# Patient Record
Sex: MALE | Race: WHITE
[De-identification: names, ages, dates, MRNs, and addresses within clinical notes are randomized per-mention and may not be internally consistent; named-entity substitution may affect disease eponyms.]

---

## 2021-05-05 ENCOUNTER — HOSPITAL ENCOUNTER (EMERGENCY)
Dept: HOSPITAL 53 - M ED | Age: 76
Discharge: HOME | End: 2021-05-05
Payer: OTHER GOVERNMENT

## 2021-05-05 VITALS — BODY MASS INDEX: 37.52 KG/M2 | HEIGHT: 64 IN | WEIGHT: 219.8 LBS

## 2021-05-05 VITALS — DIASTOLIC BLOOD PRESSURE: 67 MMHG | SYSTOLIC BLOOD PRESSURE: 135 MMHG

## 2021-05-05 DIAGNOSIS — E78.5: ICD-10-CM

## 2021-05-05 DIAGNOSIS — R07.9: Primary | ICD-10-CM

## 2021-05-05 DIAGNOSIS — I44.0: ICD-10-CM

## 2021-05-05 DIAGNOSIS — E11.9: ICD-10-CM

## 2021-05-05 DIAGNOSIS — I10: ICD-10-CM

## 2021-05-05 LAB
ALBUMIN SERPL BCG-MCNC: 4 GM/DL (ref 3.2–5.2)
ALT SERPL W P-5'-P-CCNC: 47 U/L (ref 12–78)
BASOPHILS # BLD AUTO: 0.1 10^3/UL (ref 0–0.2)
BASOPHILS NFR BLD AUTO: 0.9 % (ref 0–1)
BILIRUB CONJ SERPL-MCNC: 0.1 MG/DL (ref 0–0.2)
BILIRUB SERPL-MCNC: 0.4 MG/DL (ref 0.2–1)
BUN SERPL-MCNC: 26 MG/DL (ref 7–18)
CALCIUM SERPL-MCNC: 10.2 MG/DL (ref 8.8–10.2)
CHLORIDE SERPL-SCNC: 105 MEQ/L (ref 98–107)
CK MB CFR.DF SERPL CALC: 1.85
CK MB SERPL-MCNC: 2.7 NG/ML (ref ?–3.6)
CK SERPL-CCNC: 146 U/L (ref 39–308)
CO2 SERPL-SCNC: 28 MEQ/L (ref 21–32)
CREAT SERPL-MCNC: 1.44 MG/DL (ref 0.7–1.3)
EOSINOPHIL # BLD AUTO: 0.3 10^3/UL (ref 0–0.5)
EOSINOPHIL NFR BLD AUTO: 3.6 % (ref 0–3)
GFR SERPL CREATININE-BSD FRML MDRD: 51.2 ML/MIN/{1.73_M2} (ref 42–?)
GLUCOSE SERPL-MCNC: 98 MG/DL (ref 70–100)
HCT VFR BLD AUTO: 49.9 % (ref 42–52)
HGB BLD-MCNC: 16.7 G/DL (ref 13.5–17.5)
LIPASE SERPL-CCNC: 111 U/L (ref 73–393)
LYMPHOCYTES # BLD AUTO: 1.4 10^3/UL (ref 1.5–5)
LYMPHOCYTES NFR BLD AUTO: 16.9 % (ref 24–44)
MCH RBC QN AUTO: 29.5 PG (ref 27–33)
MCHC RBC AUTO-ENTMCNC: 33.5 G/DL (ref 32–36.5)
MCV RBC AUTO: 88.2 FL (ref 80–96)
MONOCYTES # BLD AUTO: 1 10^3/UL (ref 0–0.8)
MONOCYTES NFR BLD AUTO: 11.3 % (ref 2–8)
NEUTROPHILS # BLD AUTO: 5.7 10^3/UL (ref 1.5–8.5)
NEUTROPHILS NFR BLD AUTO: 66.8 % (ref 36–66)
PLATELET # BLD AUTO: 165 10^3/UL (ref 150–450)
POTASSIUM SERPL-SCNC: 4.2 MEQ/L (ref 3.5–5.1)
PROT SERPL-MCNC: 7.6 GM/DL (ref 6.4–8.2)
RBC # BLD AUTO: 5.66 10^6/UL (ref 4.3–6.1)
SODIUM SERPL-SCNC: 140 MEQ/L (ref 136–145)
T4 FREE SERPL-MCNC: 1.12 NG/DL (ref 0.76–1.46)
TROPONIN I SERPL-MCNC: < 0.02 NG/ML (ref ?–0.1)
TSH SERPL DL<=0.005 MIU/L-ACNC: 1.99 UIU/ML (ref 0.36–3.74)
WBC # BLD AUTO: 8.5 10^3/UL (ref 4–10)

## 2021-05-05 PROCEDURE — 85025 COMPLETE CBC W/AUTO DIFF WBC: CPT

## 2021-05-05 PROCEDURE — 93005 ELECTROCARDIOGRAM TRACING: CPT

## 2021-05-05 PROCEDURE — 82550 ASSAY OF CK (CPK): CPT

## 2021-05-05 PROCEDURE — 83690 ASSAY OF LIPASE: CPT

## 2021-05-05 PROCEDURE — 71045 X-RAY EXAM CHEST 1 VIEW: CPT

## 2021-05-05 PROCEDURE — 93041 RHYTHM ECG TRACING: CPT

## 2021-05-05 PROCEDURE — 80048 BASIC METABOLIC PNL TOTAL CA: CPT

## 2021-05-05 PROCEDURE — 71275 CT ANGIOGRAPHY CHEST: CPT

## 2021-05-05 PROCEDURE — 84484 ASSAY OF TROPONIN QUANT: CPT

## 2021-05-05 PROCEDURE — 84443 ASSAY THYROID STIM HORMONE: CPT

## 2021-05-05 PROCEDURE — 80076 HEPATIC FUNCTION PANEL: CPT

## 2021-05-05 PROCEDURE — 84439 ASSAY OF FREE THYROXINE: CPT

## 2021-05-05 PROCEDURE — 82553 CREATINE MB FRACTION: CPT

## 2021-05-05 PROCEDURE — 99285 EMERGENCY DEPT VISIT HI MDM: CPT

## 2021-05-05 PROCEDURE — 94760 N-INVAS EAR/PLS OXIMETRY 1: CPT

## 2021-05-05 PROCEDURE — 36415 COLL VENOUS BLD VENIPUNCTURE: CPT

## 2021-05-05 NOTE — ECGEPIP
[facility] - ED

                                       

                                       Test Date:    2021

Pat Name:     SARAH ANDRES            Department:   

Patient ID:   [id number]                 Room:         -

Gender:       Male                     Technician:   LR

:          1945               Requested By: Maja Lundborg-Gray 

Order Number: [id number]     Reading MD:   Sharron Cortez

                                 Measurements

Intervals                              Axis          

Rate:         76                       P:            53

KY:           216                      QRS:          15

QRSD:         102                      T:            68

QT:           382                                    

QTc:          429                                    

                           Interpretive Statements

Sinus rhythm with 1st degree AV block

No prior

Electronically Signed on 2021 20:28:48 EDT by Sharron Cortez

## 2021-05-05 NOTE — REP
INDICATION:

chest pain.



COMPARISON:

None.



TECHNIQUE:

Portable semi-erect a AP chest x-ray.



FINDINGS:

EKG electrodes are seen.  The lungs are well inflated and clear.  Right hemidiaphragm

is somewhat elevated.  Pleural angles are sharp.  Heart is not enlarged.  Pulmonary

vasculature is not increased.



IMPRESSION:

No active disease.





<Electronically signed by Edgar Akhtar > 05/05/21 0612

## 2021-05-05 NOTE — REP
INDICATION:

left sided chest pain prolonged ro pe.



COMPARISON:

Comparison is made with today's portable chest x-ray..



TECHNIQUE:

Contrast dose:  75 ML of Isovue 370 are administered intravenously.

CT technique:  Helical scanning is acquired and overlapping 1.5 mm and contiguous 3 mm

axial images are reformatted.  In addition, maximum intensity projection and

multiplanar re-formation images are generated in sagittal and coronal imaging

projections.



FINDINGS:

There is good opacification in the pulmonary arterial tree.  There is no evidence of

vessel cut off or filling defect to suggest pulmonary embolus.  Homogeneous opacity is

seen in the thoracic aorta.  There is no evidence of aneurysm or dissection.

Lung window settings demonstrate no evidence of pulmonary mass lesion or infiltrate.

No significant pulmonary nodule is appreciated.  There is no evidence of pleural or

pericardial effusion.

In the upper abdomen, calcified gallstones are noted in the gallbladder.  No adrenal

lesion is seen.



IMPRESSION:

No CT evidence of pulmonary embolus. No active cardiopulmonary disease seen.





<Electronically signed by Edgar Akhtar > 05/05/21 6300

## 2021-07-30 ENCOUNTER — HOSPITAL ENCOUNTER (OUTPATIENT)
Dept: HOSPITAL 53 - M LABSMTC | Age: 76
End: 2021-07-30
Attending: ANESTHESIOLOGY
Payer: OTHER GOVERNMENT

## 2021-07-30 DIAGNOSIS — Z20.822: ICD-10-CM

## 2021-07-30 DIAGNOSIS — Z01.812: Primary | ICD-10-CM

## 2021-08-04 ENCOUNTER — HOSPITAL ENCOUNTER (OUTPATIENT)
Dept: HOSPITAL 53 - M OPP | Age: 76
Discharge: HOME | End: 2021-08-04
Attending: SURGERY
Payer: OTHER GOVERNMENT

## 2021-08-04 VITALS — DIASTOLIC BLOOD PRESSURE: 75 MMHG | SYSTOLIC BLOOD PRESSURE: 132 MMHG

## 2021-08-04 VITALS — HEIGHT: 65 IN | BODY MASS INDEX: 35.29 KG/M2 | WEIGHT: 211.8 LBS

## 2021-08-04 DIAGNOSIS — Z79.899: ICD-10-CM

## 2021-08-04 DIAGNOSIS — E11.9: ICD-10-CM

## 2021-08-04 DIAGNOSIS — K63.5: Primary | ICD-10-CM

## 2021-08-04 DIAGNOSIS — Z79.4: ICD-10-CM

## 2021-08-04 DIAGNOSIS — Z85.46: ICD-10-CM

## 2021-08-04 DIAGNOSIS — K92.1: ICD-10-CM

## 2021-08-04 DIAGNOSIS — K64.1: ICD-10-CM

## 2021-08-04 DIAGNOSIS — K62.7: ICD-10-CM

## 2021-08-04 NOTE — ROOR
________________________________________________________________________________

Patient Name: Richard Silverman            Procedure Date: 8/4/2021 7:31 AM

MRN: [id number]                          Account Number: [id number]

YOB: 1945               Age: 75

Room: [facility]                            Gender: Male

Note Status: Finalized                 

________________________________________________________________________________

 

Procedure:            Colonoscopy

Indications:          Hematochezia

Providers:            DO Brittnee Rosas MD:         Katelyn De La Garza, [facility], Admin.

Requesting Provider:  

Medicines:            Propofol per Anesthesia

Complications:        No immediate complications.

________________________________________________________________________________

Procedure:            Pre-Anesthesia Assessment:

                      - Prior to the procedure, a History and Physical was 

                      performed, and patient medications and allergies were 

                      reviewed. The patient is competent. The risks and 

                      benefits of the procedure and the sedation options and 

                      risks were discussed with the patient. All questions 

                      were answered and informed consent was obtained. Patient 

                      identification and proposed procedure were verified by 

                      the physician, the nurse, the anesthetist and the 

                      technician in the endoscopy suite. Mental Status 

                      Examination: alert and oriented. Airway Examination: 

                      normal oropharyngeal airway and neck mobility. 

                      Respiratory Examination: clear to auscultation. CV 

                      Examination: normal. Prophylactic Antibiotics: The 

                      patient does not require prophylactic antibiotics. Prior 

                      Anticoagulants: The patient has taken no previous 

                      anticoagulant or antiplatelet agents. ASA Grade 

                      Assessment: III - A patient with severe systemic 

                      disease. After reviewing the risks and benefits, the 

                      patient was deemed in satisfactory condition to undergo 

                      the procedure. The anesthesia plan was to use monitored 

                      anesthesia care (MAC). Immediately prior to 

                      administration of medications, the patient was 

                      re-assessed for adequacy to receive sedatives. The heart 

                      rate, respiratory rate, oxygen saturations, blood 

                      pressure, adequacy of pulmonary ventilation, and 

                      response to care were monitored throughout the 

                      procedure. The physical status of the patient was 

                      re-assessed after the procedure.

                      The Colonoscope was introduced through the anus and 

                      advanced to the cecum, identified by appendiceal orifice 

                      and ileocecal valve. The colonoscopy was performed 

                      without difficulty. The patient tolerated the procedure 

                      well.

                                                                                

Findings:

     A less than 5 mm polyp was found in the sigmoid colon. The polyp was 

     hyperplastic. The polyp was removed with a jumbo cold forceps. Resection 

     and retrieval were complete. Estimated blood loss: none.

     Localized mild inflammation characterized by adherent blood, altered 

     vascularity, congestion (edema), friability and mucus was found in the 

     rectum. Biopsies were taken with a cold forceps for histology. Estimated 

     blood loss was minimal.

     Non-bleeding internal hemorrhoids were found during retroflexion. The 

     hemorrhoids were mild and Grade II (internal hemorrhoids that prolapse 

     but reduce spontaneously).

                                                                                

Impression:           - One less than 5 mm polyp in the sigmoid colon, removed 

                      with a jumbo cold forceps. Resected and retrieved.

                      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 

                      secondary to radiation proctitis. Biopsied.

                      - Non-bleeding internal hemorrhoids.

Recommendation:       - Patient has a contact number available for 

                      emergencies. The signs and symptoms of potential delayed 

                      complications were discussed with the patient. Return to 

                      normal activities tomorrow. Written discharge 

                      instructions were provided to the patient.

                      - Await pathology results.

                      - Repeat colonoscopy in 5-10 years for surveillance 

                      based on pathology results.

                      - Return to my office at appointment to be scheduled.

                                                                                

Procedure Code(s):    --- Professional ---

                      39304, Colonoscopy, flexible; with biopsy, single or 

                      multiple

Diagnosis Code(s):    --- Professional ---

                      K63.5, Polyp of colon

                      K62.7, Radiation proctitis

                      K64.1, Second degree hemorrhoids

                      K92.1, Melena (includes Hematochezia)

 

CPT copyright 2019 [employer]. All rights reserved.

 

The codes documented in this report are preliminary and upon  review may 

be revised to meet current compliance requirements.

 

_________________

Franklyn Ramírez DO

8/4/2021 7:52:12 AM

Electronically signed by Franklyn Ramírez DO

Number of Addenda: 0

 

Note Initiated On: 8/4/2021 7:31 AM

Estimated Blood Loss: Estimated blood loss was minimal.

## 2022-02-16 ENCOUNTER — HOSPITAL ENCOUNTER (OUTPATIENT)
Dept: HOSPITAL 53 - M CARPUL | Age: 77
End: 2022-02-16
Attending: NURSE PRACTITIONER
Payer: OTHER GOVERNMENT

## 2022-02-16 DIAGNOSIS — D75.1: Primary | ICD-10-CM

## 2022-02-16 DIAGNOSIS — R05.9: ICD-10-CM

## 2022-02-16 DIAGNOSIS — R06.09: ICD-10-CM

## 2022-05-07 ENCOUNTER — HOSPITAL ENCOUNTER (OUTPATIENT)
Dept: HOSPITAL 53 - M SLEEP | Age: 77
End: 2022-05-07
Attending: NURSE PRACTITIONER
Payer: OTHER GOVERNMENT

## 2022-05-07 DIAGNOSIS — G47.33: Primary | ICD-10-CM

## 2022-09-16 ENCOUNTER — HOSPITAL ENCOUNTER (OUTPATIENT)
Dept: HOSPITAL 53 - M SLEEP | Age: 77
End: 2022-09-16
Payer: OTHER GOVERNMENT

## 2022-09-16 DIAGNOSIS — G47.33: Primary | ICD-10-CM
